# Patient Record
Sex: FEMALE | Race: WHITE | Employment: FULL TIME
[De-identification: names, ages, dates, MRNs, and addresses within clinical notes are randomized per-mention and may not be internally consistent; named-entity substitution may affect disease eponyms.]

---

## 2019-09-09 ENCOUNTER — HOSPITAL ENCOUNTER (OUTPATIENT)
Dept: PHYSICAL THERAPY | Facility: CLINIC | Age: 29
Setting detail: THERAPIES SERIES
Discharge: HOME | End: 2019-09-09
Payer: COMMERCIAL

## 2019-09-09 PROCEDURE — 97161 PT EVAL LOW COMPLEX 20 MIN: CPT

## 2019-09-09 PROCEDURE — 97016 VASOPNEUMATIC DEVICE THERAPY: CPT

## 2019-09-09 PROCEDURE — 97140 MANUAL THERAPY 1/> REGIONS: CPT

## 2019-09-09 PROCEDURE — 97750 PHYSICAL PERFORMANCE TEST: CPT

## 2019-09-09 PROCEDURE — 97110 THERAPEUTIC EXERCISES: CPT

## 2019-09-09 NOTE — CONSULTS
Recommendations:     Increase anay by 7-10%    Strengthen glute med, glute max and piriformis as well as ankle    Consider stability shoe        FUNCTIONAL TESTS PAIN NO PAIN COMMENTS   1 legged squat [x] [] LLE: Lateral trunk lean, mariola dominance, genu valgus RLE: Genu valgus and quad dominance   1 legged HR  [x] []      Comments:  Assessment:  Pt presents complaints of L ankle pain post sprain in July with weak glutes as well as lack of ROM at L ankle in DF and GTE leading to form flaws in running as above    Problems:    [x] ? Pain:     [x] ? ROM:    [x] ? Strength:    [x] ? Function: Not able to run as she wishes   [] ? Balance  [] Increased edema:  [] Postural Deviations  [x] Gait Deviations  [] Other:      STG: (to be met in 6 treatments)  1. ? Pain:<3/10 pain L ankle with all activity  2. ? ROM: Normal L ankle DF knee straight and bent as well as GTE to assist in normalizing run form  3. ? Strength:5/5 B hip strength to facilitate acceptable run form  4. ? Function:Able to continue with current training program fo upcoming 1/2 marathon  5. Independent with Home Exercise Programs    LTG: (to be met in 12 treatments)  1. 0/10 ankle pain post runs  2. Increase functional strength and control to allow pt to perform single leg squat with little to no deviations  3. Pt able to run painfree with acceptable form as she wishes to be able to participate in Nov 1/2 marathon                 Patient goals:egain full motion    Rehab Potential:  [x] Good  [] Fair  [] Poor   Suggested Professional Referral:  [x] No  [] Yes:  Barriers to Goal Achievement[de-identified]  [x] No  [] Yes:  Domestic Concerns:  [x] No  [] Yes:    Pt. Education:  [x] Plans/Goals, Risks/Benefits discussed  [x] Home exercise program    Method of Education: [] Verbal  [] Demo  [x] Written  Comprehension of Education:  [] Verbalizes understanding. [] Demonstrates understanding. [] Needs Review.   [] Demonstrates/verbalizes understanding of HEP/Ed previously

## 2019-09-17 ENCOUNTER — HOSPITAL ENCOUNTER (OUTPATIENT)
Dept: PHYSICAL THERAPY | Facility: CLINIC | Age: 29
Setting detail: THERAPIES SERIES
Discharge: HOME | End: 2019-09-17
Payer: COMMERCIAL

## 2019-09-17 PROCEDURE — 97112 NEUROMUSCULAR REEDUCATION: CPT

## 2019-09-17 PROCEDURE — 97110 THERAPEUTIC EXERCISES: CPT

## 2019-09-17 PROCEDURE — 97140 MANUAL THERAPY 1/> REGIONS: CPT

## 2019-09-23 ENCOUNTER — HOSPITAL ENCOUNTER (OUTPATIENT)
Dept: PHYSICAL THERAPY | Facility: CLINIC | Age: 29
Setting detail: THERAPIES SERIES
Discharge: HOME | End: 2019-09-23
Payer: COMMERCIAL

## 2019-09-23 PROCEDURE — 97112 NEUROMUSCULAR REEDUCATION: CPT

## 2019-09-23 PROCEDURE — 97140 MANUAL THERAPY 1/> REGIONS: CPT

## 2021-05-27 ENCOUNTER — HOSPITAL ENCOUNTER (EMERGENCY)
Age: 31
Discharge: HOME | End: 2021-05-27
Attending: SPECIALIST
Payer: COMMERCIAL

## 2021-05-27 VITALS
HEART RATE: 70 BPM | RESPIRATION RATE: 16 BRPM | DIASTOLIC BLOOD PRESSURE: 96 MMHG | SYSTOLIC BLOOD PRESSURE: 143 MMHG | BODY MASS INDEX: 24.36 KG/M2 | TEMPERATURE: 98.4 F | WEIGHT: 146.2 LBS | OXYGEN SATURATION: 100 % | HEIGHT: 65 IN

## 2021-05-27 DIAGNOSIS — S60.042A CONTUSION OF LEFT RING FINGER WITHOUT DAMAGE TO NAIL, INITIAL ENCOUNTER: Primary | ICD-10-CM

## 2021-05-27 PROCEDURE — 99284 EMERGENCY DEPT VISIT MOD MDM: CPT

## 2021-05-27 RX ORDER — CEPHALEXIN 500 MG/1
500 CAPSULE ORAL 4 TIMES DAILY
Qty: 28 CAPSULE | Refills: 0 | Status: SHIPPED | OUTPATIENT
Start: 2021-05-27 | End: 2021-06-03

## 2021-05-27 ASSESSMENT — PAIN DESCRIPTION - LOCATION: LOCATION: FINGER (COMMENT WHICH ONE)

## 2021-05-27 ASSESSMENT — PAIN DESCRIPTION - ORIENTATION: ORIENTATION: LEFT

## 2021-05-27 ASSESSMENT — PAIN DESCRIPTION - PAIN TYPE: TYPE: ACUTE PAIN

## 2021-05-27 ASSESSMENT — PAIN SCALES - GENERAL: PAINLEVEL_OUTOF10: 4

## 2021-05-28 NOTE — ED PROVIDER NOTES
finger. There is no evidence of any purulent drainage and there is no erythema or discharge. No evidence of subungual hematoma. Neurovascular examination is intact distally with sensations present to light touch, pinprick and two-point discrimination   Skin:     General: Skin is warm and dry. Neurological:      General: No focal deficit present. Mental Status: She is alert and oriented to person, place, and time. DIFFERENTIAL DIAGNOSIS/ MDM:     Contusion, hematoma left ring finger    DIAGNOSTIC RESULTS     EKG: All EKG's are interpreted by the Emergency Department Physician who either signs or Co-signs this chart in the absence of a cardiologist.    None obtained    RADIOLOGY:   Interpretation per the Radiologist below, if available at the time of this note:    No results found. LABS:  No results found for this visit on 05/27/21. EMERGENCY DEPARTMENT COURSE:   Vitals:    Vitals:    05/27/21 2138   BP: (!) 143/96   Pulse: 70   Resp: 16   Temp: 98.4 °F (36.9 °C)   TempSrc: Oral   SpO2: 100%   Weight: 66.3 kg (146 lb 3.2 oz)   Height: 5' 5\" (1.651 m)     -------------------------  BP: (!) 143/96, Temp: 98.4 °F (36.9 °C), Pulse: 70, Resp: 16    Orders Placed This Encounter   Medications    cephALEXin (KEFLEX) 500 MG capsule     Sig: Take 1 capsule by mouth 4 times daily for 7 days     Dispense:  28 capsule     Refill:  0         Patient was reassured and discharged home with instructions to apply ice packs locally, elevate the left hand, take Tylenol as needed for the pain, start Keflex if she notices any signs of infection, follow-up with PCP, return if worse. Patient was offered removal of the rings from the left ring finger but she prefers to leave it on at this time as its freely mobile at the base of the finger and not causing any neurovascular compromise. CONSULTS:  None    PROCEDURES:  None    FINAL IMPRESSION      1.  Contusion of left ring finger without damage to nail, initial